# Patient Record
Sex: MALE | Race: WHITE
[De-identification: names, ages, dates, MRNs, and addresses within clinical notes are randomized per-mention and may not be internally consistent; named-entity substitution may affect disease eponyms.]

---

## 2023-01-31 PROBLEM — Z00.00 ENCOUNTER FOR PREVENTIVE HEALTH EXAMINATION: Status: ACTIVE | Noted: 2023-01-31

## 2023-02-27 ENCOUNTER — APPOINTMENT (OUTPATIENT)
Dept: UROLOGY | Facility: CLINIC | Age: 43
End: 2023-02-27
Payer: COMMERCIAL

## 2023-02-27 VITALS
BODY MASS INDEX: 40.17 KG/M2 | HEIGHT: 74 IN | HEART RATE: 84 BPM | DIASTOLIC BLOOD PRESSURE: 80 MMHG | WEIGHT: 313 LBS | TEMPERATURE: 98 F | RESPIRATION RATE: 14 BRPM | OXYGEN SATURATION: 99 % | SYSTOLIC BLOOD PRESSURE: 138 MMHG

## 2023-02-27 DIAGNOSIS — Z78.9 OTHER SPECIFIED HEALTH STATUS: ICD-10-CM

## 2023-02-27 DIAGNOSIS — N50.812 LEFT TESTICULAR PAIN: ICD-10-CM

## 2023-02-27 DIAGNOSIS — R10.2 PELVIC AND PERINEAL PAIN: ICD-10-CM

## 2023-02-27 DIAGNOSIS — Z80.9 FAMILY HISTORY OF MALIGNANT NEOPLASM, UNSPECIFIED: ICD-10-CM

## 2023-02-27 PROCEDURE — 99203 OFFICE O/P NEW LOW 30 MIN: CPT

## 2023-02-27 NOTE — ASSESSMENT
[FreeTextEntry1] : Physical exam including palpation throughout the perineum at least a portion reachable with a digital rectal exam though not pleasant did not reproduce the pain that he is talking about.  He has a history of lumbar disc disease and I wonder if this is more referred pain especially as if it is not prostatitis I cannot think what would cause perineal as well as testicular discomfort other than the nerve root distribution.\par \par He has numbness in the lower extremities he is scheduled to see the neurologist anyway is going to ask them if there is anything that they can think of.  If the neurology says is not neurological he will come back we may do some radiologic studies and see if there is anything that I [facility]

## 2023-02-27 NOTE — HISTORY OF PRESENT ILLNESS
[4] : 4 [Sharp] : sharp [Dull] : dull [Intermittent] : intermittently [___ x Day] : occur [unfilled] times a day [Mild] : mild [Improving] : improving [None] : No relieving factors are noted [FreeTextEntry1] : Corey is a 42-year-old male born August 24, 1980 who over the last 6 to 9 months has had varied degrees of discomfort in the left testicle its not so much of pain is a dysesthesia concurrent with perineal discomfort deep inside.  He does not know if it is related to his chronic hemorrhoids, it is not related to sexual activity, urination, orgasm or the lack thereof.  He has no dysuria, hematuria, he is sexually active with 1 partner.  They are monogamous a little under a year.  He was tested and was negative and they never tested her she tended to be a little more discrete and he was though she probably should be tested.  He does not think any of this is due to the relationship with her. [de-identified] : Left testicle and perineum [de-identified] : Can be a transient sharp for 15-minute dull pain [de-identified] : About 8 months ago [de-identified] : Anywhere from transient to 15 minutes [de-identified] : Anywhere from transient to 15 minutes

## 2023-02-27 NOTE — PHYSICAL EXAM
[General Appearance - Well Developed] : well developed [General Appearance - Well Nourished] : well nourished [Normal Appearance] : normal appearance [Well Groomed] : well groomed [General Appearance - In No Acute Distress] : no acute distress [Edema] : no peripheral edema [Respiration, Rhythm And Depth] : normal respiratory rhythm and effort [Exaggerated Use Of Accessory Muscles For Inspiration] : no accessory muscle use [Urethral Meatus] : meatus normal [Penis Abnormality] : normal circumcised penis [Epididymis] : the epididymides were normal [Testes Tenderness] : no tenderness of the testes [Testes Mass (___cm)] : there were no testicular masses [Rectal Exam - Rectum] : digital rectal exam was normal [Prostate Enlargement] : the prostate was not enlarged [Prostate Tenderness] : the prostate was not tender [Normal Station and Gait] : the gait and station were normal for the patient's age [] : no rash [No Focal Deficits] : no focal deficits [Oriented To Time, Place, And Person] : oriented to person, place, and time [Affect] : the affect was normal [Mood] : the mood was normal [Not Anxious] : not anxious [FreeTextEntry1] : rectal exam does not reproduce the pain, no si joint tenderness

## 2023-02-27 NOTE — LETTER HEADER
[FreeTextEntry3] : Yolie Martinez M.D.\par Director Emeritus of Urology\par [facility]\par [address] [place] [address], [address]\[place] [id number]

## 2023-04-07 ENCOUNTER — OUTPATIENT (OUTPATIENT)
Dept: OUTPATIENT SERVICES | Facility: HOSPITAL | Age: 43
LOS: 1 days | End: 2023-04-07
Payer: COMMERCIAL

## 2023-04-07 DIAGNOSIS — E78.2 MIXED HYPERLIPIDEMIA: ICD-10-CM

## 2023-04-07 DIAGNOSIS — Z00.8 ENCOUNTER FOR OTHER GENERAL EXAMINATION: ICD-10-CM

## 2023-04-07 PROCEDURE — 75571 CT HRT W/O DYE W/CA TEST: CPT | Mod: 26

## 2023-04-07 PROCEDURE — 75571 CT HRT W/O DYE W/CA TEST: CPT

## 2023-04-08 DIAGNOSIS — E78.2 MIXED HYPERLIPIDEMIA: ICD-10-CM
